# Patient Record
Sex: MALE | Race: WHITE | Employment: OTHER | URBAN - METROPOLITAN AREA
[De-identification: names, ages, dates, MRNs, and addresses within clinical notes are randomized per-mention and may not be internally consistent; named-entity substitution may affect disease eponyms.]

---

## 2017-02-01 PROCEDURE — 81001 URINALYSIS AUTO W/SCOPE: CPT | Performed by: INTERNAL MEDICINE

## 2017-02-01 PROCEDURE — 87086 URINE CULTURE/COLONY COUNT: CPT | Performed by: INTERNAL MEDICINE

## 2017-02-03 PROCEDURE — 84153 ASSAY OF PSA TOTAL: CPT | Performed by: INTERNAL MEDICINE

## 2017-02-03 PROCEDURE — 88108 CYTOPATH CONCENTRATE TECH: CPT | Performed by: INTERNAL MEDICINE

## 2017-03-25 PROBLEM — N20.0 KIDNEY STONE ON LEFT SIDE: Status: ACTIVE | Noted: 2017-03-25

## 2017-08-16 PROBLEM — Z12.11 COLON CANCER SCREENING: Status: ACTIVE | Noted: 2017-08-16

## 2017-08-16 PROBLEM — Z79.899 ENCOUNTER FOR LONG-TERM (CURRENT) USE OF MEDICATIONS: Status: ACTIVE | Noted: 2017-08-16

## 2017-08-16 PROBLEM — Z11.59 NEED FOR HEPATITIS C SCREENING TEST: Status: ACTIVE | Noted: 2017-08-16

## 2017-08-17 PROBLEM — Z11.59 NEED FOR HEPATITIS C SCREENING TEST: Status: ACTIVE | Noted: 2017-08-17

## 2017-08-17 PROBLEM — Z79.899 ENCOUNTER FOR LONG-TERM (CURRENT) USE OF MEDICATIONS: Status: ACTIVE | Noted: 2017-08-17

## 2017-08-17 PROBLEM — Z12.11 COLON CANCER SCREENING: Status: ACTIVE | Noted: 2017-08-17

## 2017-08-17 PROBLEM — R16.1 SPLENOMEGALY, NOT ELSEWHERE CLASSIFIED: Status: ACTIVE | Noted: 2017-08-17

## 2017-08-17 PROBLEM — R39.14 BENIGN PROSTATIC HYPERPLASIA WITH INCOMPLETE BLADDER EMPTYING: Status: ACTIVE | Noted: 2017-08-17

## 2017-08-17 PROBLEM — N40.1 BENIGN PROSTATIC HYPERPLASIA WITH INCOMPLETE BLADDER EMPTYING: Status: ACTIVE | Noted: 2017-08-17

## 2017-08-18 PROBLEM — Z11.59 NEED FOR HEPATITIS C SCREENING TEST: Status: ACTIVE | Noted: 2017-08-18

## 2017-08-18 PROBLEM — Z79.899 ENCOUNTER FOR LONG-TERM (CURRENT) USE OF MEDICATIONS: Status: ACTIVE | Noted: 2017-08-18

## 2017-08-18 PROBLEM — Z12.11 COLON CANCER SCREENING: Status: ACTIVE | Noted: 2017-08-18

## 2017-08-18 PROBLEM — L92.9 NON-CASEATING GRANULOMA: Status: ACTIVE | Noted: 2017-08-18

## 2017-09-02 PROBLEM — N20.0 BILATERAL NEPHROLITHIASIS: Status: ACTIVE | Noted: 2017-09-02

## 2017-09-02 PROBLEM — I72.9 PSEUDOANEURYSM (HCC): Status: ACTIVE | Noted: 2017-08-31

## 2017-09-07 PROCEDURE — 84153 ASSAY OF PSA TOTAL: CPT | Performed by: INTERNAL MEDICINE

## 2018-02-08 PROBLEM — Z79.899 ENCOUNTER FOR LONG-TERM (CURRENT) USE OF MEDICATIONS: Status: ACTIVE | Noted: 2018-02-08

## 2018-02-08 PROBLEM — Z11.59 NEED FOR HEPATITIS C SCREENING TEST: Status: ACTIVE | Noted: 2018-02-08

## 2018-02-08 PROBLEM — Z12.11 COLON CANCER SCREENING: Status: ACTIVE | Noted: 2018-02-08

## 2018-08-20 PROBLEM — N20.0 NEPHROLITHIASIS: Status: ACTIVE | Noted: 2017-09-02

## 2018-08-20 PROBLEM — N40.1 BPH WITH OBSTRUCTION/LOWER URINARY TRACT SYMPTOMS: Status: ACTIVE | Noted: 2017-08-17

## 2018-08-20 PROBLEM — I72.9 PSEUDOANEURYSM (HCC): Status: RESOLVED | Noted: 2017-08-31 | Resolved: 2018-08-20

## 2018-08-20 PROBLEM — N20.0 KIDNEY STONE ON LEFT SIDE: Status: RESOLVED | Noted: 2017-03-25 | Resolved: 2018-08-20

## 2018-08-20 PROBLEM — N13.8 BPH WITH OBSTRUCTION/LOWER URINARY TRACT SYMPTOMS: Status: ACTIVE | Noted: 2017-08-17

## 2018-08-22 PROCEDURE — 84153 ASSAY OF PSA TOTAL: CPT | Performed by: INTERNAL MEDICINE

## 2019-10-01 PROBLEM — R94.6 ABNORMAL THYROID FUNCTION TEST: Status: ACTIVE | Noted: 2019-10-01

## 2019-11-29 ENCOUNTER — OFFICE VISIT (OUTPATIENT)
Dept: FAMILY MEDICINE CLINIC | Facility: CLINIC | Age: 74
End: 2019-11-29
Payer: MEDICARE

## 2019-11-29 VITALS
HEART RATE: 86 BPM | TEMPERATURE: 98 F | SYSTOLIC BLOOD PRESSURE: 120 MMHG | HEIGHT: 74 IN | OXYGEN SATURATION: 98 % | DIASTOLIC BLOOD PRESSURE: 66 MMHG | WEIGHT: 206 LBS | RESPIRATION RATE: 20 BRPM | BODY MASS INDEX: 26.44 KG/M2

## 2019-11-29 DIAGNOSIS — J06.9 UPPER RESPIRATORY TRACT INFECTION, UNSPECIFIED TYPE: Primary | ICD-10-CM

## 2019-11-29 DIAGNOSIS — H66.002 ACUTE SUPPURATIVE OTITIS MEDIA OF LEFT EAR WITHOUT SPONTANEOUS RUPTURE OF TYMPANIC MEMBRANE, RECURRENCE NOT SPECIFIED: ICD-10-CM

## 2019-11-29 PROCEDURE — 99203 OFFICE O/P NEW LOW 30 MIN: CPT | Performed by: PHYSICIAN ASSISTANT

## 2019-11-29 RX ORDER — IRBESARTAN 150 MG/1
TABLET ORAL
Refills: 10 | COMMUNITY
Start: 2019-11-25

## 2019-11-29 RX ORDER — FLUTICASONE PROPIONATE 50 MCG
2 SPRAY, SUSPENSION (ML) NASAL DAILY
Qty: 1 BOTTLE | Refills: 0 | Status: SHIPPED | OUTPATIENT
Start: 2019-11-29 | End: 2019-12-13

## 2019-11-29 RX ORDER — AZITHROMYCIN 250 MG/1
TABLET, FILM COATED ORAL
Qty: 6 TABLET | Refills: 0 | Status: SHIPPED | OUTPATIENT
Start: 2019-11-29 | End: 2020-06-02

## 2019-11-29 NOTE — PATIENT INSTRUCTIONS
Patient Declined AVS    Verbal Instructions given      1. Azithromycin  2. Flonase  3. Increase fluids/rest  4.  Follow up with PCP

## 2019-11-29 NOTE — PROGRESS NOTES
CHIEF COMPLAINT:     Patient presents with:  Nasal Congestion: Dry cough, left ear clogged, stuffy/runny nose, X 6 days       HPI:   Trisha Lopez is a 76year old male who presents with complaints of feeling sick for 6 days.   Symptoms include mild nasal hypersplenism. • Recurrent otitis media     s/p tubes   • Sarcoidosis     splenomegaly with hypersplenism      Social History:  Social History    Tobacco Use      Smoking status: Never Smoker      Smokeless tobacco: Never Used    Alcohol use:  Yes      Al type  (primary encounter diagnosis)  Acute suppurative otitis media of left ear without spontaneous rupture of tympanic membrane, recurrence not specified    PLAN:    Patient Instructions   Patient Declined AVS    Verbal Instructions given      1.  Azithrom

## 2020-11-13 PROBLEM — M06.09 RHEUMATOID ARTHRITIS OF MULTIPLE SITES WITH NEGATIVE RHEUMATOID FACTOR (HCC): Status: ACTIVE | Noted: 2020-11-13

## 2021-08-12 LAB
CRP SERPL-MCNC: 32.5 MG/L
ERYTHROCYTE [SEDIMENTATION RATE] IN BLOOD BY WESTERGREN METHOD: 38 MM/H

## 2021-12-08 PROBLEM — L03.211 CELLULITIS OF FACE: Status: ACTIVE | Noted: 2021-12-08

## 2021-12-15 PROBLEM — D17.23 LIPOMA OF RIGHT LOWER EXTREMITY: Status: ACTIVE | Noted: 2021-12-15

## 2022-01-04 PROBLEM — L03.211 CELLULITIS OF FACE: Status: RESOLVED | Noted: 2021-12-08 | Resolved: 2022-01-04

## 2022-01-04 PROBLEM — E03.9 ACQUIRED HYPOTHYROIDISM: Status: ACTIVE | Noted: 2019-10-01

## 2022-01-04 PROBLEM — D68.69 SECONDARY HYPERCOAGULABLE STATE (HCC): Status: ACTIVE | Noted: 2022-01-04

## 2022-06-21 ENCOUNTER — APPOINTMENT (OUTPATIENT)
Dept: URGENT CARE | Age: 77
End: 2022-06-21